# Patient Record
Sex: MALE | Race: WHITE | NOT HISPANIC OR LATINO | Employment: OTHER | URBAN - METROPOLITAN AREA
[De-identification: names, ages, dates, MRNs, and addresses within clinical notes are randomized per-mention and may not be internally consistent; named-entity substitution may affect disease eponyms.]

---

## 2017-09-28 ENCOUNTER — CONVERSION ENCOUNTER (OUTPATIENT)
Dept: CARDIOLOGY | Facility: CLINIC | Age: 32
End: 2017-09-28

## 2017-12-15 ENCOUNTER — HOSPITAL ENCOUNTER (OUTPATIENT)
Dept: RESPIRATORY THERAPY | Facility: HOSPITAL | Age: 32
Discharge: HOME | End: 2017-12-15
Attending: INTERNAL MEDICINE | Admitting: INTERNAL MEDICINE

## 2017-12-28 ENCOUNTER — HOSPITAL ENCOUNTER (OUTPATIENT)
Dept: SLEEP MEDICINE | Facility: HOSPITAL | Age: 32
Discharge: HOME | End: 2017-12-28
Attending: INTERNAL MEDICINE | Admitting: INTERNAL MEDICINE

## 2018-03-15 ENCOUNTER — HOSPITAL ENCOUNTER (OUTPATIENT)
Dept: RESPIRATORY THERAPY | Facility: HOSPITAL | Age: 33
Discharge: HOME | End: 2018-03-15
Attending: INTERNAL MEDICINE | Admitting: INTERNAL MEDICINE

## 2018-05-15 ENCOUNTER — HOSPITAL ENCOUNTER (OUTPATIENT)
Dept: RESPIRATORY THERAPY | Facility: HOSPITAL | Age: 33
Discharge: HOME | End: 2018-05-15
Attending: INTERNAL MEDICINE | Admitting: INTERNAL MEDICINE

## 2019-01-21 ENCOUNTER — CONVERSION ENCOUNTER (OUTPATIENT)
Dept: CARDIOLOGY | Facility: CLINIC | Age: 34
End: 2019-01-21

## 2019-01-28 ENCOUNTER — HOSPITAL ENCOUNTER (OUTPATIENT)
Dept: CARDIOLOGY | Facility: HOSPITAL | Age: 34
Discharge: HOME | End: 2019-01-28
Attending: INTERNAL MEDICINE | Admitting: INTERNAL MEDICINE

## 2019-06-04 ENCOUNTER — HOSPITAL ENCOUNTER (OUTPATIENT)
Dept: RESPIRATORY THERAPY | Facility: HOSPITAL | Age: 34
Discharge: HOME | End: 2019-06-04
Attending: NURSE PRACTITIONER | Admitting: NURSE PRACTITIONER

## 2019-06-04 VITALS
WEIGHT: 247.2 LBS | HEART RATE: 68 BPM | WEIGHT: 228 LBS | SYSTOLIC BLOOD PRESSURE: 132 MMHG | BODY MASS INDEX: 30.08 KG/M2 | HEIGHT: 73 IN | SYSTOLIC BLOOD PRESSURE: 134 MMHG | HEART RATE: 72 BPM | OXYGEN SATURATION: 96 % | BODY MASS INDEX: 32.76 KG/M2 | DIASTOLIC BLOOD PRESSURE: 73 MMHG | DIASTOLIC BLOOD PRESSURE: 74 MMHG | RESPIRATION RATE: 16 BRPM

## 2020-06-12 PROBLEM — J45.909 AIRWAY HYPERREACTIVITY: Status: ACTIVE | Noted: 2020-06-12

## 2020-06-12 PROBLEM — G47.33 OSA ON CPAP: Status: ACTIVE | Noted: 2017-01-01

## 2020-06-12 PROBLEM — I48.91 AFIB (HCC): Status: ACTIVE | Noted: 2019-01-21

## 2020-06-12 PROBLEM — I10 HYPERTENSION: Status: ACTIVE | Noted: 2019-01-21

## 2020-06-12 PROBLEM — Z99.89 OSA ON CPAP: Status: ACTIVE | Noted: 2017-01-01

## 2020-06-16 ENCOUNTER — DOCUMENTATION (OUTPATIENT)
Dept: PULMONOLOGY | Facility: HOSPITAL | Age: 35
End: 2020-06-16

## 2020-06-16 DIAGNOSIS — G47.33 OSA (OBSTRUCTIVE SLEEP APNEA): Primary | ICD-10-CM

## 2020-06-16 RX ORDER — NEBIVOLOL 10 MG/1
1 TABLET ORAL EVERY 24 HOURS
COMMUNITY
Start: 2013-09-26

## 2020-06-16 NOTE — PROGRESS NOTES
PULMONARY/ CRITICAL CARE/ SLEEP MEDICINE OUTPATIENT CONSULT/ FOLLOW UP NOTE        Patient Name:  Cam Rivero    :  1985    Medical Record:  [id number]    PRIMARY CARE PHYSICIAN     Hailey Yates APRN    REASON FOR CONSULTATION    Cam Rivero is a 34 y.o. male who is seen in the office today for follow-up on his sleep apnea.  He had a sleep study which showed an apnea hypopnea index of 11.  Lowest oxygen saturation of 85%.  Patient is using a CPAP with a full facemask.  He denies any specific problems with the mask or the machine.  His typical bedtime is around 9 PM.  He gets up around 5:30 AM.  He denies any snoring with the mask.  He denies any complaints of daytime fatigue or sleepiness.  No sleepy driving.  He denies any restless leg syndrome or cataplexy symptoms.  No specific exacerbating factors.  His symptoms respond to CPAP use.  REVIEW OF SYSTEMS    Constitutional:  Denies fever or chills   Eyes:  Denies change in visual acuity   HENT:  Denies nasal congestion or sore throat   Respiratory:  Denies cough or shortness of breath   Cardiovascular:  Denies chest pain or edema   GI:  Denies abdominal pain, nausea, vomiting, bloody stools or diarrhea   :  Denies dysuria   Musculoskeletal:  Denies back pain or joint pain   Integument:  Denies rash   Neurologic:  Denies headache, focal weakness or sensory changes   Endocrine:  Denies polyuria or polydipsia   Lymphatic:  Denies swollen glands   Psychiatric:  Denies depression or anxiety     MEDICAL HISTORY    Past Medical History:   Diagnosis Date   • Afib (CMS/HCC) 2019   • Airway hyperreactivity 2020   • Hypertension 2019   • YASMANI on CPAP 2017        SURGICAL HISTORY    Past Surgical History:   Procedure Laterality Date   • APPENDECTOMY  2017   • CARDIAC ABLATION  2019        FAMILY HISTORY    Family History   Problem Relation Age of Onset   • Hypertension Mother    • Hypertension Father    • Diabetes Father     • Diabetes Sister        SOCIAL HISTORY    Social History     Tobacco Use   • Smoking status: Former Smoker     Types: Cigarettes     Last attempt to quit: 01/2017     Years since quitting: 3.4   • Smokeless tobacco: Never Used   Substance Use Topics   • Alcohol use: Yes        ALLERGIES    No Known Allergies      MEDICATIONS    Current Outpatient Medications on File Prior to Visit   Medication Sig Dispense Refill   • esomeprazole (nexIUM 24HR) 20 MG capsule Take 1 tablet by mouth Daily.     • nebivolol (Bystolic) 10 MG tablet Take 1 tablet by mouth Daily.       No current facility-administered medications on file prior to visit.        PHYSICAL EXAM    There were no vitals filed for this visit.   Vital signs and physical exam could not be done because this being a telehealth visit due to COVID 19 pandemic emergency      ASSESSMENT & PLAN:    There are no diagnoses linked to this encounter.    Obstructive sleep apnea  -CPAP compliance data sheet reviewed.  Total days with the machine use are 128, days without the machine use are 13.  Average use is 6 hours and 54 minutes.  Average AHI 1.8.  Days use more than 4 hours nightly: 85%.  Patient is on auto CPAP  -Patient with good compliance and good symptomatic improvement.  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  He is cleared to drive from sleep apnea standpoint.    History of hypertension  -Patient reports good blood pressure control    History of atrial fibrillation  -That is post ablation procedure.  -Patient denies any recent symptoms    We will plan to see him back in office in 1 year or earlier if any problems arise    This was a phone conversation in lieu of in-person visit.  The patient provided verbal consent for an over the phone visit.  I spent  6 minutes on the call conducting an interview, performing a limited exam by phone and educating the patient on my assessment and plan.  Additional 15 minutes were spent on  documentation and review of data for this visit.  Patient did not have the ability to do a video visit for this encounter.      This document has been electronically signed by  Kristie Eaton MD  9:41 AM

## 2020-06-16 NOTE — PROGRESS NOTES
LETTER FOR DOT FOR SLEEP APNEA        To Whom It May Concern:     Cam Rivero ( : 1985) is a patient of mine with diagnosis of sleep apnea.  Patient is using PAP device.  Download of this device on 20 shows good compliance.  Patient is not having any symptoms of daytime sleepiness.  This is not a guarantee that patient will not experience sleepiness in the work environment. The patient has been counseled to not drive if  feeling sleepy.    Patient is released to drive commercially.  If there are any changes in medical condition, weight or sleepiness the patient has been instructed to contact us at our office immediately.    If you have any additional questions please contact me at [phone]    Sincerely yours,    This document has been electronically signed by  Kristie Eaton MD  9:54 AM

## 2021-06-09 ENCOUNTER — OFFICE VISIT (OUTPATIENT)
Dept: PULMONOLOGY | Facility: HOSPITAL | Age: 36
End: 2021-06-09

## 2021-06-09 VITALS
HEART RATE: 61 BPM | HEIGHT: 73 IN | WEIGHT: 228 LBS | RESPIRATION RATE: 16 BRPM | OXYGEN SATURATION: 98 % | BODY MASS INDEX: 30.22 KG/M2 | SYSTOLIC BLOOD PRESSURE: 118 MMHG | DIASTOLIC BLOOD PRESSURE: 77 MMHG | TEMPERATURE: 97.2 F

## 2021-06-09 DIAGNOSIS — G47.33 OSA ON CPAP: Primary | ICD-10-CM

## 2021-06-09 DIAGNOSIS — Z99.89 OSA ON CPAP: Primary | ICD-10-CM

## 2021-06-09 DIAGNOSIS — I10 HYPERTENSION, UNSPECIFIED TYPE: ICD-10-CM

## 2021-06-09 DIAGNOSIS — I48.0 PAROXYSMAL ATRIAL FIBRILLATION (HCC): ICD-10-CM

## 2021-06-09 PROCEDURE — G0463 HOSPITAL OUTPT CLINIC VISIT: HCPCS

## 2021-06-09 RX ORDER — FAMOTIDINE 20 MG/1
20 TABLET, FILM COATED ORAL
COMMUNITY
Start: 2020-08-31 | End: 2021-08-31

## 2021-06-09 NOTE — PROGRESS NOTES
PULMONARY  CONSULT NOTE      PATIENT IDENTIFICATION:  Name: Cam Rivero  Age: 35 y.o.  Sex: male  :  1985  MRN: [id number]    DATE OF CONSULTATION:  2021                     CHIEF COMPLAINT: Obstructive sleep apnea    History of Present Illness:   Cam Rivero is a 35 y.o. male Pt on CPAP feeling better more energy especially the night he use it more than 4 hours, no sleepiness no fatigue no tiredness, no mask irritation no dryness, compliance report reviewed with pt AHI< 5 with good usage.     Review of Systems:   Constitutional: negative   Eyes: negative   ENT/oropharynx: negative   Cardiovascular: negative   Respiratory:  As above   Gastrointestinal: negative   Genitourinary: negative   Neurological: negative   Musculoskeletal: negative   Integument/breast: negative   Endocrine: negative   Allergic/Immunologic: negative     Past Medical History:  Past Medical History:   Diagnosis Date   • Afib (CMS/HCC) 2019   • Airway hyperreactivity 2020   • Hypertension 2019   • YASMANI on CPAP 2017       Past Surgical History:  Past Surgical History:   Procedure Laterality Date   • APPENDECTOMY  2017   • CARDIAC ABLATION  2019        Family History:  Family History   Problem Relation Age of Onset   • Hypertension Mother    • Hypertension Father    • Diabetes Father    • Diabetes Sister         Social History:   Social History     Tobacco Use   • Smoking status: Former Smoker     Types: Cigarettes     Quit date: 2017     Years since quittin.4   • Smokeless tobacco: Never Used   Substance Use Topics   • Alcohol use: Yes     Comment: social        Allergies:  No Known Allergies    Home Meds:  (Not in a hospital admission)      Objective:    Vitals Ranges:   Temp:  [97.2 °F (36.2 °C)] 97.2 °F (36.2 °C)  Heart Rate:  [61] 61  Resp:  [16] 16  BP: (118)/(77) 118/77  Body mass index is 30.08 kg/m².     Exam:  General Appearance:  WDWN    HEENT:   without obvious abnormality,   Conjunctiva/corneas clear,  Normal external ear canals, no drainage    Clear orsalmucosa,  Mallampati score 3    Neck:  Supple, symmetrical, trachea midline. No JVD.  Lungs:   Bilateral basal rhonchi bilaterally, respirations unlabored symmetrical wall movement.    Chest wall:  No tenderness or deformity.    Heart:  Regular rate and rhythm, S1 and S2 normal.  Extremities: Trace edema no clubbing or Cyanosis        Data Review:  All labs (24hrs): No results found for this or any previous visit (from the past 24 hour(s)).     Imaging:  XR Chest 1 View  DATE OF SERVICE:  9/21/2017 8:19 AM    PROCEDURE:  XR CHEST PORTABLE 1 VIEW    HISTORY:  Abdomen Pain , recent appendectomy    COMPARISON:  03/26/2017    TECHNIQUE:  A radiologic portable AP view of the chest was obtained.    DISCUSSION:  The cardiomediastinal silhouette is within normal limits.  There is no focal  consolidation, pneumothorax, or large pleural effusion.  Bony thorax appears  intact.    IMPRESSION:  No acute process.    Yossi Cancino MD    DS: 09/21/2017 08:57  Report ID: [id number]  cc: AN HERR  FINAL AUTHENTICATED COPY  CT Abdomen Pelvis With Contrast  DATE OF SERVICE:  9/21/2017 4:07 AM    PROCEDURE:  CT ABD/PELVIS W    HISTORY:  Right lower quadrant abdominal pain    COMPARISON:  None.    TECHNIQUE:  Routine transaxial slices were obtained through abdomen and pelvis after the  intravenous administration of 100 ml of Isovue 370.  Reconstructed coronal and  sagittal images were also obtained.    DISCUSSION:  The appendix is mildly distended measuring up to 9 mm diameter and there is  adjacent fat stranding concerning for inflammation.  There is no organized  fluid collection or evidence of perforation/free air.  There is a small fat  containing left inguinal hernia.  Urinary bladder, colon, small bowel, liver,  gallbladder, bile ducts, spleen, pancreas, kidneys, ureters, adrenal glands,  stomach  and abdominal vasculature all appear within normal limits.  There is  no ascites, pneumoperitoneum or lymphadenopathy.  The urinary bladder and  prostate gland appear normal size.  There are no acute osseous abnormalities  or destructive bone lesions.  There is partial sacralization of the L5  transverse processes forming pseudarthroses with the sacral ala.  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  Lung bases are clear.  Heart size is normal.  Distal esophagus is unremarkable.    IMPRESSION:    1. Findings are concerning for early acute appendicitis.  No perforation or  abscess.  2. Chronic left-sided spondylolysis at L5.  3. Small fat containing left inguinal hernia.    Pablo Yen MD    DS: 09/21/2017 07:52  Report ID: [id number]  cc:  NAY ARREOLA    FINAL AUTHENTICATED COPY       ASSESSMENT:  Diagnoses and all orders for this visit:    YASMANI on CPAP    Hypertension, unspecified type    Paroxysmal atrial fibrillation (CMS/HCC)    Other orders  -     famotidine (PEPCID) 20 MG tablet; Take 20 mg by mouth.        PLAN:    This patient with obstructive sleep apnea, compliance is improved. Encourage to use it more frequent, I re-emphasized on pt the long and short term benefit of treating YASMANI.     Treating YASMANI will improve BP control and heart rate     Follow-up 1 year    Frank Winters MD. D, ABSM.  6/9/2021  15:48 EDT

## 2022-06-16 ENCOUNTER — OFFICE VISIT (OUTPATIENT)
Dept: PULMONOLOGY | Facility: HOSPITAL | Age: 37
End: 2022-06-16

## 2022-06-16 VITALS
BODY MASS INDEX: 30.75 KG/M2 | RESPIRATION RATE: 16 BRPM | HEART RATE: 76 BPM | WEIGHT: 232 LBS | HEIGHT: 73 IN | SYSTOLIC BLOOD PRESSURE: 131 MMHG | OXYGEN SATURATION: 97 % | DIASTOLIC BLOOD PRESSURE: 82 MMHG

## 2022-06-16 DIAGNOSIS — J45.20 MILD INTERMITTENT ASTHMA, UNSPECIFIED WHETHER COMPLICATED: ICD-10-CM

## 2022-06-16 DIAGNOSIS — I10 HYPERTENSION, UNSPECIFIED TYPE: ICD-10-CM

## 2022-06-16 DIAGNOSIS — Z99.89 OSA ON CPAP: Primary | ICD-10-CM

## 2022-06-16 DIAGNOSIS — G47.33 OSA ON CPAP: Primary | ICD-10-CM

## 2022-06-16 PROCEDURE — G0463 HOSPITAL OUTPT CLINIC VISIT: HCPCS

## 2022-06-16 NOTE — PROGRESS NOTES
SLEEP/PULMONARY  CLINIC NOTE      PATIENT IDENTIFICATION:  Name: Cam Rivero  Age: 36 y.o.  Sex: male  :  1985  MRN: [id number]    DATE OF CONSULTATION:  2022                     CHIEF COMPLAINT: YASMANI    History of Present Illness:   Cam Rivero is a 36 y.o. male Pt on CPAP feeling better more energy especially the night he use it more than 4 hours, no sleepiness no fatigue no tiredness, no mask irritation no dryness, compliance report reviewed with pt AHI< 5 with good usage.       Review of Systems:   Constitutional: negative   Eyes: negative   ENT/oropharynx: negative   Cardiovascular: negative   Respiratory: negative   Gastrointestinal: negative   Genitourinary: negative   Neurological: negative   Musculoskeletal: negative   Integument/breast: negative   Endocrine: negative   Allergic/Immunologic: negative     Past Medical History:  Past Medical History:   Diagnosis Date   • Afib (HCC) 2019   • Airway hyperreactivity 2020   • Hypertension 2019   • YASMANI on CPAP 2017       Past Surgical History:  Past Surgical History:   Procedure Laterality Date   • APPENDECTOMY  2017   • CARDIAC ABLATION  2019        Family History:  Family History   Problem Relation Age of Onset   • Hypertension Mother    • Hypertension Father    • Diabetes Father    • Diabetes Sister         Social History:   Social History     Tobacco Use   • Smoking status: Former Smoker     Types: Cigarettes     Quit date: 2017     Years since quittin.4   • Smokeless tobacco: Never Used   Substance Use Topics   • Alcohol use: Yes     Comment: social        Allergies:  No Known Allergies    Home Meds:  (Not in a hospital admission)      Objective:    Vitals Ranges:   Heart Rate:  [76] 76  Resp:  [16] 16  BP: (131)/(82) 131/82  Body mass index is 30.61 kg/m².     Exam:  General Appearance:  WDWN    HEENT:   without obvious abnormality,  Conjunctiva/corneas clear,  Normal external ear canals, no  drainage    Clear orsalmucosa,  Mallampati score 3    Neck:  Supple, symmetrical, trachea midline. No JVD.  Lungs:   Bilateral basal rhonchi bilaterally, respirations unlabored symmetrical wall movement.    Chest wall:  No tenderness or deformity.    Heart:  Regular rate and rhythm, S1 and S2 normal.  Extremities: Trace edema no clubbing or Cyanosis        Data Review:  All labs (24hrs): No results found for this or any previous visit (from the past 24 hour(s)).     Imaging:  XR Chest 2Vw  Narrative: RADIOLOGY REPORT    FACILITY:  [facility]  UNIT/AGE/GENDER: J.ED  ER      AGE:27 Y          SEX:M  PATIENT NAME/:  EH JIMENEZ R    1985  UNIT NUMBER:  [id number]  ACCOUNT NUMBER:  [id number]  ACCESSION NUMBER:  [id number]    Exam: Chest PA and lateral.    DATE: 2013    HISTORY: Fever    COMPARISON: None    FINDINGS: PA and lateral views of the chest reveal a normal cardiomediastinal silhouette. Patchy infiltrate is noted in the left lower lobe. There is no pulmonary edema or pleural effusion. No pneumothorax is identified.  The bony structures are   unremarkable.  Impression: IMPRESSION:  1. Left lower lobe pneumonia.    Dictated by: Jayden Berger M.D.    Images and Report reviewed and interpreted by: Jayden Berger M.D.    <PS><Electronically signed by: Jayden Berger M.D.>  [id number]    D: 201357  T: 201357  MRA Brain Wo Contrast  Narrative: RADIOLOGY REPORT    FACILITY:  [facility]  UNIT/AGE/GENDER: F.MRI  OP      AGE:29 Y          SEX:M  PATIENT NAME/:  EH JIMENEZ R    1985  UNIT NUMBER:  [id number]  ACCOUNT NUMBER:  [id number]  ACCESSION NUMBER:  [id number]    MRA Head    Examination Date:  2015    CLINICAL HISTORY: Headaches. essential hypertension..    Comparison: None    TECHNIQUE: 3-D time-of-flight MRA images of the [place] are provided. MIP reformations are provided as  well. NASCET criteria is used to calculate percentages of stenosis when applicable.     FINDINGS: The caliber of the intracranial internal carotid arteries are normal. No significant stenosis is identified. These give rise to normal A1 segments of the DEBORAH's. The anterior communicating artery is normal. The A2 segments of the DEBORAH's are also   normal in caliber without evidence of stenosis. The middle cerebral arteries are normal in caliber throughout without evidence of stenosis. The MCA trifurcations are normal. The right and left posterior communicating  arteries are not well-seen..    Distal vertebral arteries are patent. Left pica origin is identified and patent. Basilar artery is normal. PCAs are patent.    No aneurysm is identified.  Impression: IMPRESSION:  1. Normal MRA of the head.    Dictated by: Jayden Berger M.D.    Images and Report reviewed and interpreted by: Jayden Berger M.D.    <PS><Electronically signed by: Jayden Berger M.D.>  2015 0747    D: 201544  T: 201544  XR Abdomen Ap Only  RADIOLOGY REPORT    FACILITY:  [facility]  UNIT/AGE/GENDER: ELLA  OP      AGE:32 Y          SEX:M  PATIENT NAME/:  EH JIMENEZ R    1985  UNIT NUMBER:  [id number]  ACCOUNT NUMBER:  [id number]  ACCESSION NUMBER:  [id number]    EXAMINATION(S): XR ABDOMEN AP ONLY    DATE: 2018    History : Pain in the upper abdomen.    COMPARISON: None.    FINDINGS: Single view abdomen is submitted. There is a non-obstructive bowel gas pattern identified. There is gas in non-dilated loops of small and large intestine. There is mild stool burden identified. No calcifications are seen. There is spina bifida   occulta which is a normal variant.    IMPRESSION: Non-obstructive bowel gas pattern.    Dictated by: Chandler Mina M.D.    Images and Report reviewed and interpreted by: Chandler Mina M.D.    <PS><Electronically signed by: Chandler Mina M.D.>  2018 0859    D:  2018 1612  T: 2018 1612  XR Wrist Comp Min 3 Vw LT  RADIOLOGY REPORT    FACILITY:  [facility]  UNIT/AGE/GENDER: TATIANNAICCTYLR  OP      AGE:33 Y          SEX:M  PATIENT NAME/:  EH JIMENEZ R    1985  UNIT NUMBER:  [id number]  ACCOUNT NUMBER:  [id number]  ACCESSION NUMBER:  [id number]    EXAM: XR WRIST COMP MIN 3 VW LT 2018 7:01 PM    HISTORY: FOOSH today     COMPARISON: None.    TECHNIQUE:  PA, lateral, oblique views of the left wrist    FINDINGS:     There is no fracture or malalignment. Joint spacing is normal. The soft tissues are normal in appearance.    IMPRESSION:     No visible fracture or malalignment of the left wrist.    Dictated by: Owen Strong M.D.    Images and Report reviewed and interpreted by: Owen Strong M.D.    <PS><Electronically signed by: Owen Strong M.D.>  2018 1009    D: 2018 1009  T: 2018 1009  Duplex US Venous Ext Low Bilateral  RADIOLOGY REPORT  FACILITY: [facility]  AGE/GENDER:  AGE: 33 Y            GENDER: M  PATIENT NAME/: EH JIMENEZ R    : 1985  UNIT NUMBER: [id number]  ACCESSION NUMBER: [id number]  ACCOUNT:     PROCEDURE PERFORMED: DUPLEX US VENOUS EXT LOW BILATERAL    Conclusions: Bilateral lower extremities without evidence of deep or superficial vein thrombus.  No prior examination for comparison.   Preliminary technologist findings called to Leti MENDOZA at 1435.    THIS DOCUMENT HAS BEEN ELECTRONICALLY SIGNED BY: Ilana Wiley MD  XR L Spine Ap & Lat W/Spot  RADIOLOGY REPORT    FACILITY:  [facility]  UNIT/AGE/GENDER: M.ED  ER      AGE:33 Y          SEX:M  PATIENT NAME/:  EH JIMENEZ R    1985  UNIT NUMBER:  [id number]  ACCOUNT NUMBER:  [id number]  ACCESSION NUMBER:  [id number]  [id number]    EXAMINATION(S): XR CHEST 1 VW, XR L SPINE AP AND LAT W/SPOT    DATE: 2019 (accession [id number]),  02/15/2019 (accession [id number])    HISTORY: chest pain. Acute chest pain and low back pain. Initial encounter.    COMPARISON: Chest x-ray 2013    FINDING(S): Single view chest and 3 views of the L-spine was/were submitted for review.     CHEST: Cardiac size and mediastinal contour normal. No pneumothorax or pleural effusion. No focal consolidation. Mild elevation the right hemidiaphragm.    Lumbar spine: Normal coronal alignment is seen. The sagittal alignment is normal. No discogenic degenerative change is seen.    No fractures are detected. There is no abnormality of the SI joints. The abdomen soft tissues are within normal limits.    IMPRESSION:     1.No radiographic evidence of acute lumbar spine fracture  2.No active cardiopulmonary disease    Dictated by: Chandler Mina M.D.    Images and Report reviewed and interpreted by: Chandler Mina M.D.    <PS><Electronically signed by: Chandler Mina M.D.>  2019    D: 2019  T: 2019  XR Chest 1 Vw  RADIOLOGY REPORT    FACILITY:  [facility]  UNIT/AGE/GENDER: M.ED  ER      AGE:33 Y          SEX:M  PATIENT NAME/:  EH JIMENEZ R    1985  UNIT NUMBER:  [id number]  ACCOUNT NUMBER:  [id number]  ACCESSION NUMBER:  [id number]  [id number]    EXAMINATION(S): XR CHEST 1 VW, XR L SPINE AP AND LAT W/SPOT    DATE: 2019 (accession [id number]), 02/15/2019 (accession [id number])    HISTORY: chest pain. Acute chest pain and low back pain. Initial encounter.    COMPARISON: Chest x-ray 2013    FINDING(S): Single view chest and 3 views of the L-spine was/were submitted for review.     CHEST: Cardiac size and mediastinal contour normal. No pneumothorax or pleural effusion. No focal consolidation. Mild elevation the right hemidiaphragm.    Lumbar spine: Normal coronal alignment is seen. The sagittal alignment is normal. No discogenic degenerative change is seen.    No  fractures are detected. There is no abnormality of the SI joints. The abdomen soft tissues are within normal limits.    IMPRESSION:     1.No radiographic evidence of acute lumbar spine fracture  2.No active cardiopulmonary disease    Dictated by: Chandler Mina M.D.    Images and Report reviewed and interpreted by: Chandler Mina M.D.    <PS><Electronically signed by: Chandler Mina M.D.>  2019 0906    D: 2019 0905  T: 2019 0905  XR Ribs Unilat LT W Pa Chest Min3  RADIOLOGY REPORT    FACILITY:  [facility]  UNIT/AGE/GENDER: STEVEN-CL  OP      AGE:33 Y          SEX:M  PATIENT NAME/:  EH JIMENEZ R    1985  UNIT NUMBER:  [id number]  ACCOUNT NUMBER:  [id number]  ACCESSION NUMBER:  [id number]    XR RIBS UNILAT LT W PA CHEST MIN3    DATE: 2019    COMPARISON: None available    INDICATION: left rib pain s/p falling onto left rib area yesterday in yard, slipped and fell while running..        IMPRESSION:   1. Negative for left rib fracture.  2. No pneumothorax, pleural effusion, or focal lung consolidation. Heart size is normal    Dictated by: Dustin Robles M.D.    Images and Report reviewed and interpreted by: Dustin Robles M.D.    <PS><Electronically signed by: Dustin Robles M.D.>  2019 1051    D: 2019 1050  T: 2019 1050  XR C Spine Min 4 Views  RADIOLOGY REPORT    FACILITY:  [facility]  UNIT/AGE/GENDER: PLMCLRK  OP      AGE:34 Y          SEX:M  PATIENT NAME/:  EH JIMENEZ R    1985  UNIT NUMBER:  [id number]  ACCOUNT NUMBER:  [id number]  ACCESSION NUMBER:  [id number]    EXAMINATION: Cervical spine minimum 4 views    INDICATION: Chronic neck pain    COMPARISON: None    FINDINGS:     There is no fracture or compression deformity. The alignment is normal. The disc spaces are preserved. No significant narrowing of the bony neural foramina. No prevertebral soft tissue swelling. The predental space is within  normal limits. The odontoid   is intact and the atlantoaxial lateral masses are not displaced.    IMPRESSION:    Unremarkable cervical spine radiographs.    Dictated by: Jm Whitt D.O.    Images and Report reviewed and interpreted by: Jm Whitt D.O.    <PS><Electronically signed by: Jm Whitt D.O.>  2019 1311    D: 2019 1310  T: 2019 1310  Ankle Brachial Index  RADIOLOGY REPORT  FACILITY: [facility]  AGE/GENDER:  AGE: 34 Y            GENDER: M  PATIENT NAME/: EH JIMENEZ R    : 1985  UNIT NUMBER: [id number]  ACCESSION NUMBER: [id number]  ACCOUNT:     PROCEDURE PERFORMED: NVL ANKLE BRACHIAL INDEX    Conclusions: Right lower extremity demonstrates normal arterial circulation at rest with ankle-brachial index of 1.19 and digit pressure of 129 mmHg.   Left lower extremity demonstrates normal arterial circulation at rest with ankle-brachial index of 1.22 and digit pressure of 104 mmHg.   Digit pressure is adequate for healing.  No prior examination for comparison.    THIS DOCUMENT HAS BEEN ELECTRONICALLY SIGNED BY: Elliott Mahan MD  US Gallbladder  RADIOLOGY REPORT    FACILITY:  [facility]  UNIT/AGE/GENDER: M.US  OP      AGE:34 Y          SEX:M  PATIENT NAME/:  EH JIMENEZ R    1985  UNIT NUMBER:  [id number]  ACCOUNT NUMBER:  [id number]  ACCESSION NUMBER:  [id number]    EXAM: ULTRASOUND ABDOMEN, LIMITED EXAM .    DATE:10/15/2020    HISTORY:  Right upper quadrant abdominal pain, initial encounter        FINDINGS:     The liver appears grossly normal.     There are no gallstones seen. There is no evidence for gallbladder wall thickening.     The common bile duct measures 3 mm, which is within normal limits.     The pancreas is suboptimally visualized due to overlying gas.      The right kidney is grossly unremarkable.    There is no ascites.    IMPRESSION:     No acute  abnormalities. Specifically, there is no evidence for biliary pathology.    Dictated by: Keyur Ball M.D.    Images and Report reviewed and interpreted by: Keyur Ball M.D.    <PS><Electronically signed by: Keyur Ball M.D.>  10/15/2020 0843    D: 10/15/2020 0843  T: 10/15/2020 0843  CT Abdomen & Pelvis W IV Contrast Oral Contrast if Indicated by Radiology  RADIOLOGY REPORT    FACILITY:  [facility]  UNIT/AGE/GENDER: M.CT  OP      AGE:35 Y          SEX:M  PATIENT NAME/:  EH JIMENEZ R    1985  UNIT NUMBER:  [id number]  ACCOUNT NUMBER:  [id number]  ACCESSION NUMBER:  [id number]    EXAMINATION: CT ABDOMEN AND PELVIS WITH CONTRAST                              DATE:  2021    HISTORY:Generalized abdominal pain. Abdominal distention    TECHNIQUE:   Helical CT of the abdomen and pelvis was performed following the administration of oral contrast and during an uneventful injection of 100 mL of opitray 300 nonionic contrast media.      CT scans at this facility use dose modulation, iterative reconstruction and weight based dosing to reduce radiation dose to as low as reasonably achievable    COMPARISON: None    FINDINGS:    The lung bases are clear.    The liver, gallbladder, spleen, pancreas, adrenal glands and kidneys are unremarkable.    There is colonic diverticulosis without evidence for acute diverticulitis. There is no free fluid or air.    There is no lymphadenopathy.    Osseous structures are within normal limits. There is a small left inguinal hernia containing fat only.    IMPRESSION:    1. No acute intra-abdominal or pelvic abnormalities. No definite source for the patient's abdominal pain is identified.    2. Colonic diverticulosis.    Dictated by: Keyur Ball M.D.    Images and Report reviewed and interpreted by: Keyur Ball M.D.    <PS><Electronically signed by: Keyur Ball M.D.>  2021 1208    D: 2021 1204  T: 2021  1204       ASSESSMENT:  Diagnoses and all orders for this visit:    YASMANI on CPAP    Mild intermittent asthma, unspecified whether complicated    Hypertension, unspecified type        PLAN:      This patient with obstructive sleep apnea, compliance is improved. Encourage to use it more frequent, I re-emphasized on pt the long and short term benefit of treating YASMANI.     Treating YASMANI will improve BP control and heart rate control heart rate controlled       Follow-up 1 year    Frank Winters MD. D, ABSM.  6/16/2022  15:55 EDT